# Patient Record
Sex: MALE | URBAN - METROPOLITAN AREA
[De-identification: names, ages, dates, MRNs, and addresses within clinical notes are randomized per-mention and may not be internally consistent; named-entity substitution may affect disease eponyms.]

---

## 2020-01-22 ENCOUNTER — APPOINTMENT (RX ONLY)
Dept: URBAN - METROPOLITAN AREA CLINIC 106 | Facility: CLINIC | Age: 50
Setting detail: DERMATOLOGY
End: 2020-01-22

## 2020-01-22 DIAGNOSIS — L29.1 PRURITUS SCROTI: ICD-10-CM

## 2020-01-22 PROBLEM — E05.90 THYROTOXICOSIS, UNSPECIFIED WITHOUT THYROTOXIC CRISIS OR STORM: Status: ACTIVE | Noted: 2020-01-22

## 2020-01-22 PROCEDURE — 99213 OFFICE O/P EST LOW 20 MIN: CPT

## 2020-01-22 PROCEDURE — ? COUNSELING

## 2020-01-22 PROCEDURE — ? PRESCRIPTION

## 2020-01-22 PROCEDURE — ? ADDITIONAL NOTES

## 2020-01-22 RX ORDER — ALCLOMETASONE DIPROPIONATE 0.5 MG/G
1 CREAM TOPICAL
Qty: 1 | Refills: 5 | Status: ERX | COMMUNITY
Start: 2020-01-22

## 2020-01-22 RX ORDER — DICAPRYLYL CARBONATE/DIMETH
1 SPRAY, NON-AEROSOL (ML) TOPICAL
Qty: 1 | Refills: 3 | Status: ERX | COMMUNITY
Start: 2020-01-22

## 2020-01-22 RX ADMIN — Medication 1: at 00:00

## 2020-01-22 RX ADMIN — ALCLOMETASONE DIPROPIONATE 1: 0.5 CREAM TOPICAL at 00:00

## 2020-01-22 ASSESSMENT — LOCATION ZONE DERM: LOCATION ZONE: GENITALIA

## 2020-01-22 ASSESSMENT — LOCATION SIMPLE DESCRIPTION DERM: LOCATION SIMPLE: SCROTUM

## 2020-01-22 ASSESSMENT — LOCATION DETAILED DESCRIPTION DERM: LOCATION DETAILED: LEFT ANTERIOR SCROTUM

## 2020-01-22 NOTE — PROCEDURE: ADDITIONAL NOTES
Detail Level: Simple
Additional Notes: Will try Aclovate cream, Levicyn anti pruritic spray. If not with improved/persistent control, pt to RTC.